# Patient Record
Sex: MALE | Race: WHITE
[De-identification: names, ages, dates, MRNs, and addresses within clinical notes are randomized per-mention and may not be internally consistent; named-entity substitution may affect disease eponyms.]

---

## 2019-02-06 ENCOUNTER — HOSPITAL ENCOUNTER (OUTPATIENT)
Dept: HOSPITAL 72 - GAS | Age: 79
Discharge: HOME | End: 2019-02-06
Payer: MEDICARE

## 2019-02-06 VITALS — DIASTOLIC BLOOD PRESSURE: 74 MMHG | SYSTOLIC BLOOD PRESSURE: 127 MMHG

## 2019-02-06 VITALS — DIASTOLIC BLOOD PRESSURE: 66 MMHG | SYSTOLIC BLOOD PRESSURE: 128 MMHG

## 2019-02-06 VITALS — SYSTOLIC BLOOD PRESSURE: 137 MMHG | DIASTOLIC BLOOD PRESSURE: 67 MMHG

## 2019-02-06 VITALS — DIASTOLIC BLOOD PRESSURE: 66 MMHG | SYSTOLIC BLOOD PRESSURE: 114 MMHG

## 2019-02-06 VITALS — SYSTOLIC BLOOD PRESSURE: 118 MMHG | DIASTOLIC BLOOD PRESSURE: 76 MMHG

## 2019-02-06 VITALS — SYSTOLIC BLOOD PRESSURE: 113 MMHG | DIASTOLIC BLOOD PRESSURE: 69 MMHG

## 2019-02-06 VITALS — HEIGHT: 65 IN | BODY MASS INDEX: 26.66 KG/M2 | WEIGHT: 160 LBS

## 2019-02-06 VITALS — SYSTOLIC BLOOD PRESSURE: 118 MMHG | DIASTOLIC BLOOD PRESSURE: 66 MMHG

## 2019-02-06 DIAGNOSIS — Z79.84: ICD-10-CM

## 2019-02-06 DIAGNOSIS — K57.30: ICD-10-CM

## 2019-02-06 DIAGNOSIS — F32.9: ICD-10-CM

## 2019-02-06 DIAGNOSIS — I10: ICD-10-CM

## 2019-02-06 DIAGNOSIS — K62.5: Primary | ICD-10-CM

## 2019-02-06 DIAGNOSIS — F41.9: ICD-10-CM

## 2019-02-06 DIAGNOSIS — E11.9: ICD-10-CM

## 2019-02-06 DIAGNOSIS — K64.8: ICD-10-CM

## 2019-02-06 DIAGNOSIS — K21.9: ICD-10-CM

## 2019-02-06 LAB
ADD MANUAL DIFF: NO
BASOPHILS NFR BLD AUTO: 0.9 % (ref 0–2)
EOSINOPHIL NFR BLD AUTO: 2.2 % (ref 0–3)
ERYTHROCYTE [DISTWIDTH] IN BLOOD BY AUTOMATED COUNT: 12.4 % (ref 11.6–14.8)
HCT VFR BLD CALC: 30.9 % (ref 42–52)
HGB BLD-MCNC: 10.5 G/DL (ref 14.2–18)
LYMPHOCYTES NFR BLD AUTO: 31.6 % (ref 20–45)
MCV RBC AUTO: 97 FL (ref 80–99)
MONOCYTES NFR BLD AUTO: 6.9 % (ref 1–10)
NEUTROPHILS NFR BLD AUTO: 58.4 % (ref 45–75)
PLATELET # BLD: 143 K/UL (ref 150–450)
RBC # BLD AUTO: 3.19 M/UL (ref 4.7–6.1)
WBC # BLD AUTO: 6.5 K/UL (ref 4.8–10.8)

## 2019-02-06 PROCEDURE — 94003 VENT MGMT INPAT SUBQ DAY: CPT

## 2019-02-06 PROCEDURE — 94150 VITAL CAPACITY TEST: CPT

## 2019-02-06 PROCEDURE — 82962 GLUCOSE BLOOD TEST: CPT

## 2019-02-06 PROCEDURE — 36415 COLL VENOUS BLD VENIPUNCTURE: CPT

## 2019-02-06 PROCEDURE — 93005 ELECTROCARDIOGRAM TRACING: CPT

## 2019-02-06 PROCEDURE — 85025 COMPLETE CBC W/AUTO DIFF WBC: CPT

## 2019-02-06 PROCEDURE — 45378 DIAGNOSTIC COLONOSCOPY: CPT

## 2019-02-06 NOTE — ANETHESIA PREOPERATIVE EVAL
Anesthesia Pre-op PMH/ROS


General


Date of Evaluation:  Feb 6, 2019


Time of Evaluation:  08:23


Anesthesiologist:  Tatyana


ASA Score:  ASA 3


Mallampati Score


Class I : Soft palate, uvula, fauces, pillars visible


Class II: Soft palate, uvula, fauces visible


Class III: Soft palate, base of uvula visible


Class IV: Only hard plate visible


Mallampati Classification:  Class II


Surgeon:  Anitha


Diagnosis:  GI bleed


Surgical Procedure:  Colonoscopy


Anesthesia History:  none


Family History:  no anesthesia problems


Allergies:  


Coded Allergies:  


     No Known Allergies (Unverified , 7/21/16)


Patient NPO?:  Yes





Past Medical History


Cardiovascular:  Reports: HTN - mild; 


   Denies: CAD, MI, valve dz, arrhythmia, other


Pulmonary:  Denies: asthma, COPD, FRANCES, other


Gastrointestinal/Genitourinary:  Reports: GERD; 


   Denies: CRI, ESRD, other


Neurologic/Psychiatric:  Reports: depression/anxiety; 


   Denies: dementia, CVA, TIA, other


Endocrine:  Reports: DM - stablen on pills; 


   Denies: hypothyroidism, steroids, other


HEENT:  Denies: cataract (L), cataract (R), glaucoma, [place] (L), [place] (R), other


Hematology/Immune:  Denies: anemia, DVT, bleeding disorder, other


Musculoskeletal/Integumentary:  Denies: OA, RA, DJD, DDD, edema, other


PMH Narrative:


as above


PSxH Narrative:


see H&P





Anesthesia Pre-op Phys. Exam


Physician Exam





Last Vital Signs








  Date Time  Temp Pulse Resp B/P (MAP) Pulse Ox O2 Delivery O2 Flow Rate FiO2


 


2/6/19 07:46      Room Air  


 


2/6/19 07:37 96.7 64 18 137/67 98   








Constitutional:  NAD


Neurologic:  CN 2-12 intact


Cardiovascular:  RRR


Respiratory:  CTA


Gastrointestinal:  S/NT/ND





Airway Exam


Mallampati Score:  Class II


MO:  limited


Neck:  short


ROM:  limited


Teeth:  intact


Dentures:  no upper, no lower





Anesthesia Pre-op A/P


Studies


Pre-op Studies:  EKG - SR





Risk Assessment & Plan


Assessment:


ASA3


Plan:


MAC


Status Change Before Surgery:  No











Marco Joe MD Feb 6, 2019 08:26

## 2019-02-06 NOTE — SHORT STAY SURGERY H&P
History of Present Illness


History of Present Illness


Chief Complaint


rectal bleed


HPI


Jerry Perez is a 79 year old male who was admitted on  for Rectal Bleed





Patient History


Allergies:  


Coded Allergies:  


     No Known Allergies (Unverified , 7/21/16)


PAST MEDICAL HISTORY:  


(1) HTN (hypertension)


(2) dm





Medication History


Scheduled


Atorvastatin Calcium* (Lipitor*), 10 MG ORAL BEDTIME, (Reported)


Metformin Hcl* (Metformin Hcl*), 1,000 MG ORAL BID, (Reported)


Sitagliptin (Januvia), 100 MG ORAL DAILY, (Reported)





Review of Systems


Cardiovascular:  Reports: no symptoms


Respiratory:  Reports: no symptoms


Skeletal:  Reports: no symptoms


Gastrointestinal:  Reports: no symptoms


Genitourinary:  Reports: no symptoms


Neurologic:  Reports: no symptoms


Endocrine:  Reports: no symptoms


Hematologic:  Reports: anemia





Physical Exam


Vital Signs





Last Vital Signs








  Date Time  Temp Pulse Resp B/P (MAP) Pulse Ox O2 Delivery O2 Flow Rate FiO2


 


2/6/19 07:46      Room Air  


 


2/6/19 07:37 96.7 64 18 137/67 98   








Skin:  normal


HENT:  normal


Heart:  normal


Lungs:  normal


Abdomen:  normal


Extremities:  normal





Plan


Plan of Care


colonoscopy


Attestation


Are the patient's medical conditions optimized for surgery?


Attestation Response:  yes











Shaheed Welsh MD Feb 6, 2019 08:35

## 2019-02-06 NOTE — PRE-PROCEDURE NOTE/ATTESTATION
Pre-Procedure Note/Attestation


Complete Prior to Procedure


Planned Procedure:  not applicable


Procedure Narrative:


colonoscopy





Indications for Procedure


Pre-Operative Diagnosis:


rectal bleed





Attestation


I attest that I discussed the nature of the procedure; its benefits; risks and 

complications; and alternatives (and the risks and benefits of such alternatives

), prior to the procedure, with the patient (or the patient's legal 

representative).





I attest that, if there was a reasonable possibility of needing a blood 

transfusion, the patient (or the patient's legal representative) was given the 

[facility] standardized written summary, pursuant 

to the Adolph Darya Blood Safety Act ([place] Health and Safety Code # 1645, as 

amended).





I attest that I re-evaluated the patient just prior to the surgery and that 

there has been no change in the patient's H&P, except as documented below:











Shaheed Welsh MD Feb 6, 2019 08:35

## 2019-02-06 NOTE — 48 HOUR POST ANESTHESIA EVAL
Post Anesthesia Evaluation


Procedure:  Colonoscopy


Date of Evaluation:  Feb 6, 2019


Time of Evaluation:  09:33


Blood Pressure Systolic:  111


0:  77


Pulse Rate:  66


Respiratory Rate:  20


Temperature (Fahrenheit):  99


O2 Sat by Pulse Oximetry:  98


Airway:  patent


Nausea:  No


Vomiting:  No


Pain Intensity:  1


Hydration Status:  adequate


Cardiopulmonary Status:


stable


Mental Status/LOC:  patient returned to baseline


Follow-up Care/Observations:


n/a


Post-Anesthesia Complications:


none


Follow-up care needed:  ready to discharge











Marco Joe MD Feb 6, 2019 09:34

## 2019-02-06 NOTE — IMMEDIATE POST-OP EVALUATION
Immediate Post-Op Evalulation


Immediate Post-Op Evalulation


Procedure:  Colonoscopy


Date of Evaluation:  Feb 6, 2019


Time of Evaluation:  09:01


IV Fluids:  250


Blood Products:  none


Estimated Blood Loss:  none


Urinary Output:  none


Blood Pressure Systolic:  116


Blood Pressure Diastolic:  72


Pulse Rate:  68


Respiratory Rate:  20


O2 Sat by Pulse Oximetry:  99


Temperature (Fahrenheit):  97.6


Pain Score (1-10):  1


Nausea:  No


Vomiting:  No


Complications


none


Patient Status:  awake, patent, none


Hydration Status:  adequate











Marco Joe MD Feb 6, 2019 09:02

## 2019-02-06 NOTE — ENDOSCOPY PROCEDURE NOTE
Endoscopy Procedure Note


General


Indication for Procedure:  rectal bleed


Procedures Performed:  colonoscopy


Operative Findings/Diagnosis:  diverticulosis


Specimen:  none


Pt Tolerated Procedure Well:  Yes


Estimated Blood Loss:  none





Anesthesia


Anesthesiologist:  fransisco


Anesthesia:  MAC





Inserted Devices


Implant(s) used?:  No





Quality


Quality of Bowel Preparation:  Good


Did scope reach the cecum?:  Yes


Was there any complications?:  No





GI Core Measures


50 yrs or older w/o bx or poly:  No


10yrs. F/U not recommended:  Yes


If not recommended, why?:  Above average risk


10 yrs. F/U needed:  Yes


18 years or older w/prev. colo:  Yes


<3yrs. since last colonoscopy:  No











Shaheed Welsh MD Feb 6, 2019 08:51

## 2019-02-06 NOTE — PROCEDURE NOTE
DATE OF PROCEDURE:  02/06/2019

SURGEON:  Shaheed Welsh M.D.



PROCEDURE:  Colonoscopy.



ANESTHESIA:  Per Dr. Joe.



INSTRUMENT:  Olympus adult flexible colonoscope.



INDICATION:  Rectal bleeding.



REASON FOR PROCEDURE:  The procedure, risks, benefits, and possible

consequences, including hemorrhage, aspiration, perforation and infection,

and alternative treatments, were explained to the patient/legal guardian

by Dr. Shaheed Welsh and the patient/legal guardian understood and

accepted these risks.



DESCRIPTION OF PROCEDURE:  After informed consent was obtained, the patient

was adequately sedated, first rectal exam was performed which was normal.

Then, the scope was advanced from rectum into the cecum documented by

appendiceal orifice, ileocecal valve, and right upper quadrant palpation.

Quality of prep was good.



The patient had significant diverticulosis in both the left and right

colon but more prominent in the left colon.  We found a tick with a clot

on it but we washed it off and went away.  The tick did not look the

obvious visible vessel in it.  We thought there might be just the residual

blood from the rectal bleeding but interestingly we did not see any blood

clot in any other ticks.  By the time we realized that it was too late and

we lost the tic that we thought might have been the source of bleeding.

Overall there is no evidence of active bleeding at this time.  There is no

blood or blood products except for this little clot sitting on this tick

was seen in this colonoscopy examination.  In the _____ the patient had

evidence of medium-sized internal hemorrhoids, not actively bleeding.



SUMMARY OF FINDINGS:

1. Severe diverticulosis in both right and left colon more prominent in

the left colon.

2. Questionable bleeding from the tick in the left colon which has been

stopped at this time.

3. Internal hemorrhoids.



RECOMMENDATIONS:

1. Treat for hemorrhoids.

2. Treat constipation

3. The patient if rebleeds again, we will recommend repeat colonoscopy

and clipping up the diverticula.









  ______________________________________________

  Shaheed Welsh M.D.





DR:  Bill

D:  02/06/2019 08:56

T:  02/06/2019 17:32

JOB#:  [id number]

CC:



MAYNOR

## 2019-02-08 NOTE — CARDIOLOGY REPORT
--------------- APPROVED REPORT --------------





EKG Measurement

Heart [id number]

KS 152P-2

[id number]

[id number]

[id number]





Normal sinus rhythm

Nonspecific ST and T wave abnormality

Abnormal ECG

## 2019-02-25 ENCOUNTER — HOSPITAL ENCOUNTER (OUTPATIENT)
Dept: HOSPITAL 72 - PAN | Age: 79
Discharge: HOME | End: 2019-02-25
Payer: MEDICARE

## 2019-02-25 DIAGNOSIS — K57.90: Primary | ICD-10-CM

## 2019-02-25 DIAGNOSIS — I10: ICD-10-CM

## 2019-02-25 DIAGNOSIS — K59.00: ICD-10-CM

## 2019-02-25 PROCEDURE — 99212 OFFICE O/P EST SF 10 MIN: CPT

## 2019-02-25 NOTE — GENERAL PROGRESS NOTE
Assessment/Plan


Problem List:  


(1) Diverticulosis


ICD Codes:  K57.90 - Diverticulosis of intestine, part unspecified, without 

perforation or abscess without bleeding


SNOMED:  93280494


(2) Constipation


ICD Codes:  K59.00 - Constipation, unspecified


SNOMED:  51902643


(3) HTN (hypertension)


ICD Codes:  I10 - Essential (primary) hypertension


SNOMED:  28849452


(4) dm


Assessment/Plan


repeat colonoscopy in 5 years


Trulance daily





Subjective


ROS Limited/Unobtainable:  Yes


Allergies:  


Coded Allergies:  


     No Known Allergies (Unverified , 7/21/16)





Objective


General Appearance:  alert


EENT:  normal ENT inspection


Neck:  supple


Cardiovascular:  normal rate


Respiratory/Chest:  lungs clear


Abdomen:  normal bowel sounds, non tender, soft


Extremities:  non-tender











Shaheed Welsh MD Feb 25, 2019 14:31

## 2019-02-26 VITALS — DIASTOLIC BLOOD PRESSURE: 70 MMHG | SYSTOLIC BLOOD PRESSURE: 132 MMHG
